# Patient Record
Sex: MALE | Race: WHITE
[De-identification: names, ages, dates, MRNs, and addresses within clinical notes are randomized per-mention and may not be internally consistent; named-entity substitution may affect disease eponyms.]

---
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
## 2021-04-15 ENCOUNTER — HOSPITAL ENCOUNTER (OUTPATIENT)
Dept: HOSPITAL 76 - DI.N | Age: 21
Discharge: HOME | End: 2021-04-15
Attending: PHYSICIAN ASSISTANT
Payer: COMMERCIAL

## 2021-04-15 DIAGNOSIS — M25.522: Primary | ICD-10-CM

## 2021-04-15 NOTE — XRAY REPORT
PROCEDURE:  Elbow 3 View LT

 

INDICATIONS:  LEFT ELBOW PAIN

 

TECHNIQUE:  3 views of the elbow were acquired.  

 

COMPARISON:  None.

 

FINDINGS:  

Bones:  No fractures or dislocations.  No suspicious bony lesions.  

 

Soft tissues:  No elbow joint effusion.  No suspicious soft tissue calcifications.  

 

IMPRESSION:  

No elbow fracture or dislocation. No joint effusion.

 

Reviewed by: Zack Cade MD on 4/15/2021 1:17 PM JAIDA

Approved by: Zack Cade MD on 4/15/2021 1:17 PM AKDT

 

 

Station ID:  [id number]

## 2023-06-26 ENCOUNTER — HOSPITAL ENCOUNTER (OUTPATIENT)
Dept: HOSPITAL 76 - DI.N | Age: 23
Discharge: HOME | End: 2023-06-26
Attending: NURSE PRACTITIONER
Payer: COMMERCIAL

## 2023-06-26 DIAGNOSIS — M25.462: ICD-10-CM

## 2023-06-26 DIAGNOSIS — S83.002A: Primary | ICD-10-CM
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
